# Patient Record
Sex: FEMALE | Race: WHITE | Employment: STUDENT | URBAN - METROPOLITAN AREA
[De-identification: names, ages, dates, MRNs, and addresses within clinical notes are randomized per-mention and may not be internally consistent; named-entity substitution may affect disease eponyms.]

---
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
## 2025-05-30 NOTE — GROUP NOTE
Group Therapy Documentation    PATIENT'S NAME: Aracely Lyle  MRN:   [id number]  :   2005  ACCT. NUMBER: [id number]  DATE OF SERVICE: 20  START TIME:  9:30 AM  END TIME: 10:05 AM  FACILITATOR(S): Elham Espana  TOPIC: Child/Adol Group Therapy  Number of patients attending the group:  4  Group Length:  1 Hours    Summary of Group / Topics Discussed:    Song Discussion:    Objective(s):      Identify and express emotion    Identify significance in music and relate to real-life scenarios    Increase intrapersonal and interpersonal awareness     Develop social skills    Increase self-esteem    Encourage positive peer feedback    Build group cohesion    Music Therapy Overview:  Music Therapy is the clinical and evidence-based use of music interventions to accomplish individualized goals within a therapeutic relationship by a credentialed professional (KEYONNA).  Music therapy in the adolescent day treatment setting incorporates a variety of music interventions and musical interaction designed for patients to learn new coping skills, identify and express emotion, develop social skills, and develop intrapersonal understanding. Music therapy in this context is meant to help patients develop relationships and address issues that they may not be able to using words alone. In addition, music therapy sessions are designed to educate patients about mental health diagnoses and symptom management.       Group Attendance:  Attended group session    Patient's response to the group topic/interactions:  cooperative with task    Patient appeared to be Passively engaged.       Client specific details:  Aracely participated with encouragement in group music therapy via telehealth.  Writer tried to engage group in discussion surrounding relationships, which was a topic suggested by the group.  Upon negative statements made by a peer, the group seemed reluctant to participate in discussion.  Writer offered for the group  Insurance Verification Request      Ordering Center:     [facility]  [address] [facility] [address]  Phone: [phone]  Fax: [phone]    Facility NPI: [id number]    Provider Information:     Provider's Name Cody Mejia   Provider's NPI: [id number]  Provider's Address   [address] [place]  [facility] [address]    Patient Information:     Raj Salazar  [address]  [facility] [address]   [phone]   : 1966  AGE: 58 y.o.     GENDER: female   TODAYS DATE:  2025    Insurance:     PRIMARY INSURANCE:  Plan: CIGNA PPO  Coverage: CIGNA  Effective Date: 2020  Group Number: [unfilled]  Subscriber Number: [id number] - (Commercial)    Payer/Plan Subscr  Sex Relation Sub. Ins. ID Effective Group Num   1. CIGNA - CIGNA* RAJ SALAZAR 1966 Female Self [id number] 20 [id number]                                   [id number]       Application/product Information:     Benefit Verification Request:    Reason for Request: New Wound    [employer] 3 FAX#     Face/Hands/Feet 62672 (1st 25 sq cm)      Has patient been treated with any other Skin Substitute on this Wound:     No    If yes, How many previous applications: NA     WOUND #: 1    Total Square CM: 6 Left Dorsal Foot     Procedure setting: Other 19    Is the Patient currently in a skilled nursing facility: No     Is the wound work related: No    Procedure date: 2025    Patient Information:     Dx Codes:L97.523       Problem List Items Addressed This Visit          Other    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  The group did not express that they wished to change topics, so writer proceeded with topic.  Aracely chose song to contribute but participated minimally in discussion.